# Patient Record
Sex: MALE | Race: OTHER | NOT HISPANIC OR LATINO | URBAN - METROPOLITAN AREA
[De-identification: names, ages, dates, MRNs, and addresses within clinical notes are randomized per-mention and may not be internally consistent; named-entity substitution may affect disease eponyms.]

---

## 2021-01-28 ENCOUNTER — EMERGENCY (EMERGENCY)
Facility: HOSPITAL | Age: 31
LOS: 1 days | Discharge: HOME | End: 2021-01-28
Attending: EMERGENCY MEDICINE
Payer: COMMERCIAL

## 2021-01-28 VITALS
SYSTOLIC BLOOD PRESSURE: 113 MMHG | HEIGHT: 68 IN | TEMPERATURE: 99 F | RESPIRATION RATE: 20 BRPM | OXYGEN SATURATION: 96 % | WEIGHT: 188.94 LBS | HEART RATE: 90 BPM | DIASTOLIC BLOOD PRESSURE: 77 MMHG

## 2021-01-28 LAB
HCT VFR BLD CALC: 42.1 % — SIGNIFICANT CHANGE UP (ref 39–50)
HGB BLD-MCNC: 14.6 G/DL — SIGNIFICANT CHANGE UP (ref 13–17)
MCHC RBC-ENTMCNC: 29.9 PG — SIGNIFICANT CHANGE UP (ref 27–34)
MCHC RBC-ENTMCNC: 34.7 GM/DL — SIGNIFICANT CHANGE UP (ref 32–36)
MCV RBC AUTO: 86.3 FL — SIGNIFICANT CHANGE UP (ref 80–100)
NRBC # BLD: 0 /100 WBCS — SIGNIFICANT CHANGE UP (ref 0–0)
PLATELET # BLD AUTO: 258 K/UL — SIGNIFICANT CHANGE UP (ref 150–400)
RBC # BLD: 4.88 M/UL — SIGNIFICANT CHANGE UP (ref 4.2–5.8)
RBC # FLD: 12 % — SIGNIFICANT CHANGE UP (ref 10.3–14.5)
WBC # BLD: 6.59 K/UL — SIGNIFICANT CHANGE UP (ref 3.8–10.5)
WBC # FLD AUTO: 6.59 K/UL — SIGNIFICANT CHANGE UP (ref 3.8–10.5)

## 2021-01-28 PROCEDURE — 85027 COMPLETE CBC AUTOMATED: CPT

## 2021-01-28 PROCEDURE — 99284 EMERGENCY DEPT VISIT MOD MDM: CPT

## 2021-01-28 PROCEDURE — 36415 COLL VENOUS BLD VENIPUNCTURE: CPT

## 2021-01-28 PROCEDURE — 99283 EMERGENCY DEPT VISIT LOW MDM: CPT

## 2021-01-28 NOTE — ED PROVIDER NOTE - OBJECTIVE STATEMENT
30yoM with h/o hemorrhoids s/p colonoscopy and banding 5 yrs ago, presents with rectal bleeding, has had this intermittently since November. Last week had chills and diarrhea with more bleeding, however this resolved. Now x3 days having rectal bleeding, started off as constipation then watery, with abdominal cramping when he feels he needs to have stool. Denies vomiting, dizziness, urinary symptoms, any more fever, and all other symptoms. Has never had sexual or anal intercourse.

## 2021-01-28 NOTE — ED PROVIDER NOTE - PHYSICAL EXAMINATION
Afebrile, hemodynamically stable, saturating well  NAD, well appearing, sitting comfortably in bed  Head NCAT  EOMI grossly, anicteric, no pallor  RRR, nml S1/S2, no m/r/g  Lungs CTAB, no w/r/r  Abd soft, entirely NT, ND, nml BS, no rebound or guarding  Rectal (RN Gunjan as chaperone): small external hemorrhoid, nml light brown stool  AAO, CN's 3-12 grossly intact  FABIAN spontaneously, no leg cyanosis or edema  Skin warm, well perfused, no rashes or hives

## 2021-01-28 NOTE — ED PROVIDER NOTE - CLINICAL SUMMARY MEDICAL DECISION MAKING FREE TEXT BOX
No more abdominal pain or tenderness and character of suspicion for being cramps. No more bleeding and no clinical or hx findings of anemia. Pt has appt with his GI doctor on Monday. No more abdominal pain or tenderness and character of suspicion for being cramps. No more bleeding and no clinical or hx findings of anemia. Character low suspicion for ulcer. Pt has appt with his GI doctor on Monday. CBC great. Patient is well appearing, NAD, afebrile, hemodynamically stable. Any available tests and studies were discussed with patient. Discharged with instructions in further symptomatic care, return precautions, and need for GI f/u.

## 2021-01-28 NOTE — ED PROVIDER NOTE - NSFOLLOWUPINSTRUCTIONS_ED_ALL_ED_FT
Rectal Bleeding    WHAT YOU NEED TO KNOW:    Rectal bleeding can be caused by constipation, hemorrhoids, or anal fissures. It may also be caused by polyps, tumors, or medical conditions, such as colitis or diverticulitis.    DISCHARGE INSTRUCTIONS:    Medicines:   •Pain medicine: You may be given medicine to take away or decrease pain. Do not wait until the pain is severe before you take your medicine.      •Iron supplement: Iron helps your body make more red blood cells.       •Steroids: This medicine decreases inflammation in your rectum. It may be applied as a cream, ointment, or lotion.      •Take your medicine as directed. Contact your healthcare provider if you think your medicine is not helping or if you have side effects. Tell him of her if you are allergic to any medicine. Keep a list of the medicines, vitamins, and herbs you take. Include the amounts, and when and why you take them. Bring the list or the pill bottles to follow-up visits. Carry your medicine list with you in case of an emergency.      Follow up with your healthcare provider as directed: Write down your questions so you remember to ask them during your visits.     Drink liquids as directed: Ask your healthcare provider how much liquid to drink each day and which liquids are best for you. This will help prevent dehydration and constipation.    Contact your healthcare provider if:   •You have a fever.      •Your rectal bleeding stopped for a time, but has started again.       •You have nausea.      •You have cold, sweaty, pale skin.      •You have changes in your bowel movements, such as diarrhea.      •You have questions or concerns about your condition or care.      Return to the emergency department if:   •You are breathing faster than usual.      •You are dizzy, lightheaded, or feel faint.      •You are confused or cannot think clearly.      •You urinate less than usual or not at all.      •Your rectal bleeding is constant or heavy.      •You have severe abdominal pain or cramping. Please follow up with your GI doctor in 1-2 days.  Please return to the emergency department if you have more bleeding, dizziness, shortness of breath, vomiting, fever, or any other symptoms.      Rectal Bleeding    WHAT YOU NEED TO KNOW:    Rectal bleeding can be caused by constipation, hemorrhoids, or anal fissures. It may also be caused by polyps, tumors, or medical conditions, such as colitis or diverticulitis.    DISCHARGE INSTRUCTIONS:    Medicines:   •Pain medicine: You may be given medicine to take away or decrease pain. Do not wait until the pain is severe before you take your medicine.      •Iron supplement: Iron helps your body make more red blood cells.       •Steroids: This medicine decreases inflammation in your rectum. It may be applied as a cream, ointment, or lotion.      •Take your medicine as directed. Contact your healthcare provider if you think your medicine is not helping or if you have side effects. Tell him of her if you are allergic to any medicine. Keep a list of the medicines, vitamins, and herbs you take. Include the amounts, and when and why you take them. Bring the list or the pill bottles to follow-up visits. Carry your medicine list with you in case of an emergency.      Follow up with your healthcare provider as directed: Write down your questions so you remember to ask them during your visits.     Drink liquids as directed: Ask your healthcare provider how much liquid to drink each day and which liquids are best for you. This will help prevent dehydration and constipation.    Contact your healthcare provider if:   •You have a fever.      •Your rectal bleeding stopped for a time, but has started again.       •You have nausea.      •You have cold, sweaty, pale skin.      •You have changes in your bowel movements, such as diarrhea.      •You have questions or concerns about your condition or care.      Return to the emergency department if:   •You are breathing faster than usual.      •You are dizzy, lightheaded, or feel faint.      •You are confused or cannot think clearly.      •You urinate less than usual or not at all.      •Your rectal bleeding is constant or heavy.      •You have severe abdominal pain or cramping.

## 2021-01-28 NOTE — ED PROVIDER NOTE - PATIENT PORTAL LINK FT
You can access the FollowMyHealth Patient Portal offered by [facility] by registering at the following website: [email or web address]. By joining [employer]’s FollowMyHealth portal, you will also be able to view your health information using other applications (apps) compatible with our system.

## 2024-08-09 PROBLEM — Z00.00 ENCOUNTER FOR PREVENTIVE HEALTH EXAMINATION: Status: ACTIVE | Noted: 2024-08-09

## 2024-08-14 ENCOUNTER — NON-APPOINTMENT (OUTPATIENT)
Age: 34
End: 2024-08-14

## 2024-08-15 ENCOUNTER — APPOINTMENT (OUTPATIENT)
Dept: SURGERY | Facility: CLINIC | Age: 34
End: 2024-08-15

## 2024-08-15 VITALS
SYSTOLIC BLOOD PRESSURE: 135 MMHG | RESPIRATION RATE: 16 BRPM | WEIGHT: 172 LBS | DIASTOLIC BLOOD PRESSURE: 81 MMHG | HEIGHT: 68 IN | BODY MASS INDEX: 26.07 KG/M2 | HEART RATE: 80 BPM | OXYGEN SATURATION: 98 %

## 2024-08-15 DIAGNOSIS — Z78.9 OTHER SPECIFIED HEALTH STATUS: ICD-10-CM

## 2024-08-15 DIAGNOSIS — K64.5 PERIANAL VENOUS THROMBOSIS: ICD-10-CM

## 2024-08-15 PROCEDURE — 46600 DIAGNOSTIC ANOSCOPY SPX: CPT

## 2024-08-15 PROCEDURE — 99203 OFFICE O/P NEW LOW 30 MIN: CPT | Mod: 25

## 2024-08-15 NOTE — PHYSICAL EXAM
[Normal] : was normal [None] : there was no rectal mass  [No Rash or Lesion] : No rash or lesion [Alert] : alert [Oriented to Person] : oriented to person [Oriented to Place] : oriented to place [Oriented to Time] : oriented to time [Calm] : calm

## 2025-04-30 ENCOUNTER — NON-APPOINTMENT (OUTPATIENT)
Age: 35
End: 2025-04-30

## 2025-06-16 ENCOUNTER — NON-APPOINTMENT (OUTPATIENT)
Age: 35
End: 2025-06-16